# Patient Record
Sex: FEMALE | Race: WHITE | NOT HISPANIC OR LATINO | Employment: FULL TIME | URBAN - METROPOLITAN AREA
[De-identification: names, ages, dates, MRNs, and addresses within clinical notes are randomized per-mention and may not be internally consistent; named-entity substitution may affect disease eponyms.]

---

## 2017-07-28 ENCOUNTER — HOSPITAL ENCOUNTER (EMERGENCY)
Facility: CLINIC | Age: 32
Discharge: HOME | End: 2017-07-28
Attending: PHYSICIAN ASSISTANT | Admitting: PHYSICIAN ASSISTANT
Payer: OTHER MISCELLANEOUS

## 2017-07-28 VITALS
RESPIRATION RATE: 18 BRPM | BODY MASS INDEX: 45 KG/M2 | DIASTOLIC BLOOD PRESSURE: 91 MMHG | OXYGEN SATURATION: 98 % | TEMPERATURE: 98.1 F | WEIGHT: 280 LBS | HEART RATE: 87 BPM | HEIGHT: 66 IN | SYSTOLIC BLOOD PRESSURE: 133 MMHG

## 2017-07-28 DIAGNOSIS — S01.21XA NASAL LACERATION, INITIAL ENCOUNTER: ICD-10-CM

## 2017-07-28 DIAGNOSIS — S01.511A LIP LACERATION, INITIAL ENCOUNTER: ICD-10-CM
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
## 2017-07-28 PROCEDURE — 99283 EMERGENCY DEPT VISIT LOW MDM: CPT | Mod: 25

## 2017-07-28 PROCEDURE — 90715 TDAP VACCINE 7 YRS/> IM: CPT | Performed by: PHYSICIAN ASSISTANT

## 2017-07-28 PROCEDURE — 90471 IMMUNIZATION ADMIN: CPT

## 2017-07-28 PROCEDURE — 25000132 ZZH RX MED GY IP 250 OP 250 PS 637: Performed by: PHYSICIAN ASSISTANT

## 2017-07-28 PROCEDURE — 25000128 H RX IP 250 OP 636: Performed by: PHYSICIAN ASSISTANT

## 2017-07-28 PROCEDURE — 12015 RPR F/E/E/N/L/M 7.6-12.5 CM: CPT

## 2017-07-28 RX ORDER — HYDROCODONE BITARTRATE AND ACETAMINOPHEN 5; 325 MG/1; MG/1
1-2 TABLET ORAL EVERY 4 HOURS PRN
Qty: 10 TABLET | Refills: 0 | Status: SHIPPED | OUTPATIENT
Start: 2017-07-28 | End: 2017-12-27

## 2017-07-28 RX ORDER — HYDROCODONE BITARTRATE AND ACETAMINOPHEN 5; 325 MG/1; MG/1
2 TABLET ORAL ONCE
Status: COMPLETED | OUTPATIENT
Start: 2017-07-28 | End: 2017-07-28

## 2017-07-28 RX ORDER — DOXYCYCLINE 100 MG/1
100 CAPSULE ORAL 2 TIMES DAILY
Qty: 14 CAPSULE | Refills: 0 | Status: SHIPPED | OUTPATIENT
Start: 2017-07-28 | End: 2017-08-04

## 2017-07-28 RX ADMIN — CLOSTRIDIUM TETANI TOXOID ANTIGEN (FORMALDEHYDE INACTIVATED), CORYNEBACTERIUM DIPHTHERIAE TOXOID ANTIGEN (FORMALDEHYDE INACTIVATED), BORDETELLA PERTUSSIS TOXOID ANTIGEN (GLUTARALDEHYDE INACTIVATED), BORDETELLA PERTUSSIS FILAMENTOUS HEMAGGLUTININ ANTIGEN (FORMALDEHYDE INACTIVATED), BORDETELLA PERTUSSIS PERTACTIN ANTIGEN, AND BORDETELLA PERTUSSIS FIMBRIAE 2/3 ANTIGEN 0.5 ML: 5; 2; 2.5; 5; 3; 5 INJECTION, SUSPENSION INTRAMUSCULAR at 15:39

## 2017-07-28 RX ADMIN — HYDROCODONE BITARTRATE AND ACETAMINOPHEN 2 TABLET: 5; 325 TABLET ORAL at 15:40

## 2017-07-28 ASSESSMENT — ENCOUNTER SYMPTOMS: WOUND: 1

## 2017-07-28 NOTE — OP NOTE
Problem right PLASTIC SURGERY OPERATIVE NOTE  Patient Name:  Rima Freeman     Date of Service:  * No surgery found *     PREOPERATIVE DIAGNOSES:   1.  Traumatic laceration upper lip and nose    POSTOPERATIVE DIAGNOSES:   1.  Traumatic laceration upper lip and nose    SURGEON:  Sigrid Hinson MD   OR STAFF:  * Surgery not found *     ANESTHESIA:  * No surgery found *     ESTIMATED BLOOD LOSS:  * No surgery found *   SPECIMEN(S):  * Cannot find log *     PROCEDURES:   1.  Complex closure of laceration nose 5 cm  2.  Complex closure of laceration upper lip, 6 cm      DESCRIPTION OF PROCEDURE:  I was consulted for closure of a laceration to the nose and upper lip due to a dog bite.    Both areas were prepped and draped in a sterile manner. 1% lidocaine with epinephrine was used for local anesthesia.    The upper lip was repaired first. Several small lacerations were primarily closed with interrupted 6-0 nylon suture. The vermillion border was aligned as able although their was a defect in the vermillion. The border was repaired with interrupted 6-0 nylon suture. The intraoral mucosa was then advanced anteriorly to allow for closure of the vermillion. This was secured with interrupted 6-0 vicryl suture. The combined closure length was 6 cm.    The nasal lacerations were then repaired. Devitalized tissue was debrided. The lacerations were realigned and secured with interrupted 6-0 nylon suture. The laceration on the right side of the nose was through and through. The intranasal mucosa was realigned and secured with 6-0 vicryl suture. The external laceration was realigned and sutured with 6-0 nylon sutures. The combined closure length was 5 cm.    Antibiotic ointment applied.  Patient instructed to keep area dry for 24 hours and then may shower. She should apply antibiotic ointment to the suture lines daily. She will follow up in 10 days for suture removal. She will be started on antibiotics by ED  staff.    Sigrid Hinson MD  Plastic Surgery  [facility]  [phone] (office)

## 2017-07-28 NOTE — DISCHARGE INSTRUCTIONS

## 2017-07-28 NOTE — ED AVS SNAPSHOT
Emergency Department    [address] [facility] [address]    Phone:  [phone]    Fax:  [phone]                                       Rima Freeman   MRN: [id number]    Department:   Emergency Department   Date of Visit:  7/28/2017           After Visit Summary Signature Page     I have received my discharge instructions, and my questions have been answered. I have discussed any challenges I see with this plan with the nurse or doctor.    ..........................................................................................................................................  Patient/Patient Representative Signature      ..........................................................................................................................................  Patient Representative Print Name and Relationship to Patient    ..................................................               ................................................  Date                                            Time    ..........................................................................................................................................  Reviewed by Signature/Title    ...................................................              ..............................................  Date                                                            Time

## 2017-07-28 NOTE — ED AVS SNAPSHOT
Emergency Department    [address] [facility] [address]    Phone:  [phone]    Fax:  [phone]                                       Rima Freeman   MRN: [id number]    Department:   Emergency Department   Date of Visit:  7/28/2017           Patient Information     Date Of Birth          1985        Your diagnoses for this visit were:     Nasal laceration, initial encounter     Lip laceration, initial encounter     Dog bite, initial encounter        You were seen by Aspen Tidwell PA-C.      Follow-up Information     Follow up with Sigrid Hinson MD In 1 week.    Specialty:  Plastic Surgery    Contact information:    [facility]  [address] [address] [phone]          Follow up with  Emergency Department.    Specialty:  EMERGENCY MEDICINE    Why:  If symptoms worsen    Contact information:    [address] [facility] [address] [phone]        Discharge Instructions       Discharge Instructions  Laceration (Cut)    You were seen today for a laceration (cut).  Your doctor examined your laceration for any problems such a buried foreign body (like glass, a splinter, or gravel), or injury to blood vessels, tendons, and nerves.  Your doctor may have also rinsed and/or scrubbed your laceration to help prevent an infection.  Your laceration may have been closed with glue, staples or sutures (stitches).      It may not be possible to find all problems with your laceration on the first visit, and we can't always prevent infections.  Antibiotics are only given when the benefit is more than the risk, and don't prevent all infections. Some lacerations are too high risk to close, and are left open to heal.  All lacerations, no matter how expertly repaired, will cause scarring.    Return to the Emergency Department right away if:    You have more redness, swelling, pain, drainage (pus), a bad smell, or red streaking from your  laceration.      You have a fever of 101oF or more.    You have bleeding that you can t stop at home. If your cut starts to bleed, hold pressure on the bleeding area with a clean cloth or put pressure over the bandage.  If the bleeding doesn t stop after using constant pressure for 30 minutes, you should return to the Emergency Department for further treatment.    An area past the laceration is cool, pale, or blue compared with the other side, or has a slower return of color when squeezed.    Your dressing seems too tight or starts to get uncomfortable or painful.    You have loss of normal function or use of an area, such as being unable to straighten or bend a finger normally.    You have a numb area past the laceration.    Return to the Emergency Department or see your regular doctor if:    The laceration starts to come open.     You have something coming out of the cut or a feeling that there is something in the laceration.    Your wound will not heal, or keeps breaking open. There can always be glass, wood, dirt or other things in any wound.  They won t always show up, even on x-rays.  If a wound doesn t heal, this may be why, and it is important to follow-up with your regular doctor.    Home Care:    Take your dressing off in 12 hours, or as instructed by your doctor, to check your laceration. Remove the dressing sooner if it seems too tight or painful, or if it is getting numb, tingly, or pale past the dressing.    Gently wash your laceration 2 times a day with clean cloth and soap.     It is okay to shower, but do not let the laceration soak in water.      If your laceration was closed with wound adhesive or strips: pat it dry and leave it open to the air.     For all other repairs: after you wash your laceration, or at least 2 times a day, apply bacitracin or other antibiotic ointment to the laceration, then cover it with a Band-Aid  or gauze.    Keep the laceration clean. Wear gloves or other protective  "clothing if you are around dirt.    Follow-up:    Dr. Hinson in 1 week    Scars:  To help minimize scarring:    Wear sunscreen over the healed laceration when out in the sun.    Massage the area regularly.    You may use Vitamin E oil.    Wait a year.  Most scars will start to fade within a year.    Probiotics: If you have been given an antibiotic, you may want to also take a probiotic pill or eat yogurt with live cultures. Probiotics have \"good bacteria\" to help your intestines stay healthy. Studies have shown that probiotics help prevent diarrhea and other intestine problems (including C. diff infection) when you take antibiotics. You can buy these without a prescription in the pharmacy section of the store.     If you were given a prescription for medicine here today, be sure to read all of the information (including the package insert) that comes with your prescription.  This will include important information about the medicine, its side effects, and any warnings that you need to know about.  The pharmacist who fills the prescription can provide more information and answer questions you may have about the medicine.  If you have questions or concerns that the pharmacist cannot address, please call or return to the Emergency Department.     Opioid Medication Information    Pain medications are among the most commonly prescribed medicines, so we are including this information for all our patients. If you did not receive pain medication or get a prescription for pain medicine, you can ignore it.     You may have been given a prescription for an opioid (narcotic) pain medicine and/or have received a pain medicine while here in the Emergency Department. These medicines can make you drowsy or impaired. You must not drive, operate dangerous equipment, or engage in any other dangerous activities while taking these medications. If you drive while taking these medications, you could be arrested for DUI, or driving under " the influence. Do not drink any alcohol while you are taking these medications.     Opioid pain medications can cause addiction. If you have a history of chemical dependency of any type, you are at a higher risk of becoming addicted to pain medications.  Only take these prescribed medications to treat your pain when all other options have been tried. Take it for as short a time and as few doses as possible. Store your pain pills in a secure place, as they are frequently stolen and provide a dangerous opportunity for children or visitors in your house to start abusing these powerful medications. We will not replace any lost or stolen medicine.  As soon as your pain is better, you should flush all your remaining medication.     Many prescription pain medications contain Tylenol  (acetaminophen), including Vicodin , Tylenol #3 , Norco , Lortab , and Percocet .  You should not take any extra pills of Tylenol  if you are using these prescription medications or you can get very sick.  Do not ever take more than 3000 mg of acetaminophen in any 24 hour period.    All opioids tend to cause constipation. Drink plenty of water and eat foods that have a lot of fiber, such as fruits, vegetables, prune juice, apple juice and high fiber cereal.  Take a laxative if you don t move your bowels at least every other day. Miralax , Milk of Magnesia, Colace , or Senna  can be used to keep you regular.      Remember that you can always come back to the Emergency Department if you are not able to see your regular doctor in the amount of time listed above, if you get any new symptoms, or if there is anything that worries you.          24 Hour Appointment Hotline       To make an appointment at any [facility], call [phone] ([phone]). If you don't have a family doctor or clinic, we will help you find one. [place] clinics are conveniently located to serve the needs of you and your family.             Review of your  medicines      START taking        Dose / Directions Last dose taken    doxycycline 100 MG capsule   Commonly known as:  VIBRAMYCIN   Dose:  100 mg   Quantity:  14 capsule        Take 1 capsule (100 mg) by mouth 2 times daily for 7 days   Refills:  0        HYDROcodone-acetaminophen 5-325 MG per tablet   Commonly known as:  NORCO   Dose:  1-2 tablet   Quantity:  10 tablet        Take 1-2 tablets by mouth every 4 hours as needed for moderate to severe pain   Refills:  0                Prescriptions were sent or printed at these locations (2 Prescriptions)                   Other Prescriptions                Printed at Department/Unit printer (2 of 2)         doxycycline (VIBRAMYCIN) 100 MG capsule               HYDROcodone-acetaminophen (NORCO) 5-325 MG per tablet                Orders Needing Specimen Collection     None      Pending Results     No orders found from 7/26/2017 to 7/29/2017.            Pending Culture Results     No orders found from 7/26/2017 to 7/29/2017.            Pending Results Instructions     If you had any lab results that were not finalized at the time of your Discharge, you can call the ED Lab Result RN at [phone]. You will be contacted by this team for any positive Lab results or changes in treatment. The nurses are available 7 days a week from 10A to 6:30P.  You can leave a message 24 hours per day and they will return your call.        Test Results From Your Hospital Stay               Clinical Quality Measure: Blood Pressure Screening     Your blood pressure was checked while you were in the emergency department today. The last reading we obtained was  BP: 136/87 . Please read the guidelines below about what these numbers mean and what you should do about them.  If your systolic blood pressure (the top number) is less than 120 and your diastolic blood pressure (the bottom number) is less than 80, then your blood pressure is normal. There is nothing more that you need to do about  "it.  If your systolic blood pressure (the top number) is 120-139 or your diastolic blood pressure (the bottom number) is 80-89, your blood pressure may be higher than it should be. You should have your blood pressure rechecked within a year by a primary care provider.  If your systolic blood pressure (the top number) is 140 or greater or your diastolic blood pressure (the bottom number) is 90 or greater, you may have high blood pressure. High blood pressure is treatable, but if left untreated over time it can put you at risk for heart attack, stroke, or kidney failure. You should have your blood pressure rechecked by a primary care provider within the next 4 weeks.  If your provider in the emergency department today gave you specific instructions to follow-up with your doctor or provider even sooner than that, you should follow that instruction and not wait for up to 4 weeks for your follow-up visit.        Thank you for choosing [facility]       Thank you for choosing [facility] for your care. Our goal is always to provide you with excellent care. Hearing back from our patients is one way we can continue to improve our services. Please take a few minutes to complete the written survey that you may receive in the mail after you visit with us. Thank you!        [employer] Information     [employer] lets you send messages to your doctor, view your test results, renew your prescriptions, schedule appointments and more. To sign up, go to [email or web address] . Click on \"Log in\" on the left side of the screen, which will take you to the Welcome page. Then click on \"Sign up Now\" on the right side of the page.     You will be asked to enter the access code listed below, as well as some personal information. Please follow the directions to create your username and password.     Your access code is: [email or web address]  Expires: 10/26/2017  5:44 PM     Your access code will  in 90 days. If you need help or a new code, please call " your [facility] clinic or [phone].        Care EveryWhere ID     This is your Care EveryWhere ID. This could be used by other organizations to access your [facility] medical records  [id number]        Equal Access to Services     JAMIE SALTER : Amada Iglesias, waaxda luqadaha, qaybta kaalmada artie, reinaldo washburn. [facility] [phone].    ATENCIÓN: Si habla español, tiene a aguirre disposición servicios gratuitos de asistencia lingüística. Llame al [phone].    We comply with applicable federal civil rights laws and [place] laws. We do not discriminate on the basis of race, color, national origin, age, disability sex, sexual orientation or gender identity.            After Visit Summary       This is your record. Keep this with you and show to your community pharmacist(s) and doctor(s) at your next visit.

## 2017-07-28 NOTE — ED PROVIDER NOTES
"  History     Chief Complaint:  Dog Bite      HPI   Rima Freeman is a 31 year old female who presents with a Dog bite. 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 Boxer bit her on the face at 1430 (45 minutes ago). The patient's wounds and lacerations are on her nose and mouth area and she rates her current pain to be 9/10. The patient reports no other injuries from the incident today. The patient does not know when her last tetanus shot was. She states that there is no concern for the dog having rabies as it is up to date on its immunizations. She denies any difficulty breathing through her nose or dental involvement.     Allergies:  Amoxicillin  Augmentin  Clindamycin  Erythromycin  Penicillins    Medications:    The patient is currently on no regular medications.      Past Medical History:    History reviewed. No significant past medical history.     Past Surgical History:    History reviewed. No significant past surgical history.     Family History:    History reviewed. No significant family history.     Social History:  Tobacco use: Former Smoker, Former Smokeless Tobacco User  Alcohol use: Pos  The patient presents with a co-worker.     Review of Systems   HENT: Negative for congestion and dental problem.    Musculoskeletal:        + pain to wound sites   Skin: Positive for wound (Face and nose.).   All other systems reviewed and are negative.    Physical Exam   First Vitals:  Patient Vitals for the past 24 hrs:   BP Temp Temp src Heart Rate SpO2 Height Weight   07/28/17 1502 136/87 98.1  F (36.7  C) Oral 69 97 % 1.676 m (5' 6\") 127 kg (280 lb)               Physical Exam  General: Resting on the gurney, holding ice pack to face, appears uncomfortable.    Head:  As above.    No facial bone tenderness, step-offs or crepitus.   ENT:  Pupils are equal, round and reactive to light. EOM intact.     No septal hematoma or intranasal " laceration.   Neck:  Supple, no rigidity noted. Normal ROM.   Resp:  Non-labored breathing. No tachypnea.   MS:  Normal muscular tone.   Neuro:  GCS 15.     Awake and alert.   Skin:  As pictured above  Psych:  Normal affect. Appropriate interactions.      Emergency Department Course   Interventions:  1539: Adacel (Tdap), 0.5 mLs, IV  1540: Norco, 2 tablets, IV    Emergency Department Course:  Nursing notes and vitals reviewed.  I performed an exam of the patient as documented above.  1528: I spoke to Dr. Hinson of Plastic Surgery on the phone, who agreed to come and see the patient.  1635: Dr. Hinson of Plastic Surgery arrived to the ED to care for the patient and stitch up her facial wounds.  1737: I rechecked the patient and discussed discharge information.  Findings and plan explained to the Patient. Patient discharged home, status improved, with instructions regarding supportive care, medications, and reasons to return as well as the importance of close follow-up was reviewed.    Impression & Plan    Medical Decision Making:   Rima Freeman is a 31 year old female who presents for evaluation of a laceration to the nose and lip after a dog bite. I doubt a midface fracture and will not CT scan at this point. No signs of entrapment or displaced fracture on detailed midface clinical exam.  The wound was carefully evaluated and explored.  The laceration was closed by. Dr. Hinson of Plastic Surgery with sutures; see her note for details. No signs of foreign body.  Possible complications (infection, scarring) were reviewed with the patient.  Doxycycline was started for antibiotic prophylaxis as this is a dog bite. Tetanus status addressed.     Follow up with Plastic Surgery will be indicated for suture removal as noted in the discharge section. Dissolvable sutures were also placed and will need follow-up only if signs of infection or other issues arise.  I discussed the results, plan and any additional questions with the  patient. She verbalized understanding and agreement with the plan.      Diagnosis:    ICD-10-CM   1. Nasal laceration, initial encounter S01.21XA   2. Lip laceration, initial encounter S01.511A   3. Dog bite, initial encounter W54.0XXA     Disposition:  Discharged to home with Doxycycline and Norco.    Discharge Medications:  New Prescriptions    DOXYCYCLINE (VIBRAMYCIN) 100 MG CAPSULE    Take 1 capsule (100 mg) by mouth 2 times daily for 7 days    HYDROCODONE-ACETAMINOPHEN (NORCO) 5-325 MG PER TABLET    Take 1-2 tablets by mouth every 4 hours as needed for moderate to severe pain     Elvira NICHOLAS, am serving as a scribe on 7/28/2017 at 3:12 PM to personally document services performed by Aspen Tidwell PA-C, based on my observations and the provider's statements to me.     EMERGENCY DEPARTMENT       Aspen Tidwell PA-C  07/28/17 7357

## 2017-12-27 ENCOUNTER — APPOINTMENT (OUTPATIENT)
Dept: MRI IMAGING | Facility: CLINIC | Age: 32
End: 2017-12-27
Attending: EMERGENCY MEDICINE

## 2017-12-27 ENCOUNTER — HOSPITAL ENCOUNTER (EMERGENCY)
Facility: CLINIC | Age: 32
Discharge: HOME | End: 2017-12-28
Attending: EMERGENCY MEDICINE | Admitting: EMERGENCY MEDICINE

## 2017-12-27 DIAGNOSIS — G45.9 TRANSIENT CEREBRAL ISCHEMIA, UNSPECIFIED TYPE: ICD-10-CM

## 2017-12-27 LAB
ANION GAP SERPL CALCULATED.3IONS-SCNC: 5 MMOL/L (ref 3–14)
BUN SERPL-MCNC: 8 MG/DL (ref 7–30)
CALCIUM SERPL-MCNC: 8.8 MG/DL (ref 8.5–10.1)
CHLORIDE SERPL-SCNC: 104 MMOL/L (ref 94–109)
CO2 SERPL-SCNC: 28 MMOL/L (ref 20–32)
CREAT SERPL-MCNC: 0.57 MG/DL (ref 0.52–1.04)
ERYTHROCYTE [DISTWIDTH] IN BLOOD BY AUTOMATED COUNT: 12.6 % (ref 10–15)
GFR SERPL CREATININE-BSD FRML MDRD: >90 ML/MIN/1.7M2
GLUCOSE SERPL-MCNC: 116 MG/DL (ref 70–99)
HCG SERPL QL: NEGATIVE
HCT VFR BLD AUTO: 39.8 % (ref 35–47)
HGB BLD-MCNC: 13.1 G/DL (ref 11.7–15.7)
MCH RBC QN AUTO: 29.2 PG (ref 26.5–33)
MCHC RBC AUTO-ENTMCNC: 32.9 G/DL (ref 31.5–36.5)
MCV RBC AUTO: 89 FL (ref 78–100)
PLATELET # BLD AUTO: 475 10E9/L (ref 150–450)
POTASSIUM SERPL-SCNC: 3.9 MMOL/L (ref 3.4–5.3)
RBC # BLD AUTO: 4.48 10E12/L (ref 3.8–5.2)
SODIUM SERPL-SCNC: 137 MMOL/L (ref 133–144)
TROPONIN I SERPL-MCNC: <0.015 UG/L (ref 0–0.04)
WBC # BLD AUTO: 17.7 10E9/L (ref 4–11)

## 2017-12-27 PROCEDURE — 84703 CHORIONIC GONADOTROPIN ASSAY: CPT | Performed by: EMERGENCY MEDICINE

## 2017-12-27 PROCEDURE — 25000125 ZZHC RX 250: Performed by: EMERGENCY MEDICINE

## 2017-12-27 PROCEDURE — 93005 ELECTROCARDIOGRAM TRACING: CPT

## 2017-12-27 PROCEDURE — 70553 MRI BRAIN STEM W/O & W/DYE: CPT

## 2017-12-27 PROCEDURE — 25000132 ZZH RX MED GY IP 250 OP 250 PS 637: Performed by: EMERGENCY MEDICINE

## 2017-12-27 PROCEDURE — 99285 EMERGENCY DEPT VISIT HI MDM: CPT | Mod: 25

## 2017-12-27 PROCEDURE — 85027 COMPLETE CBC AUTOMATED: CPT | Performed by: EMERGENCY MEDICINE

## 2017-12-27 PROCEDURE — 84484 ASSAY OF TROPONIN QUANT: CPT | Performed by: EMERGENCY MEDICINE

## 2017-12-27 PROCEDURE — 25000128 H RX IP 250 OP 636: Performed by: RADIOLOGY

## 2017-12-27 PROCEDURE — A9585 GADOBUTROL INJECTION: HCPCS | Performed by: RADIOLOGY

## 2017-12-27 PROCEDURE — 80048 BASIC METABOLIC PNL TOTAL CA: CPT | Performed by: EMERGENCY MEDICINE

## 2017-12-27 RX ORDER — ACETAMINOPHEN 500 MG
500 TABLET ORAL EVERY 4 HOURS PRN
Status: DISCONTINUED | OUTPATIENT
Start: 2017-12-27 | End: 2017-12-28 | Stop reason: HOSPADM

## 2017-12-27 RX ORDER — GADOBUTROL 604.72 MG/ML
15 INJECTION INTRAVENOUS ONCE
Status: COMPLETED | OUTPATIENT
Start: 2017-12-27 | End: 2017-12-27

## 2017-12-27 RX ORDER — ONDANSETRON 4 MG/1
4 TABLET, ORALLY DISINTEGRATING ORAL ONCE
Status: COMPLETED | OUTPATIENT
Start: 2017-12-27 | End: 2017-12-27

## 2017-12-27 RX ADMIN — GADOBUTROL 13 ML: 604.72 INJECTION INTRAVENOUS at 23:49

## 2017-12-27 RX ADMIN — ACETAMINOPHEN 500 MG: 500 TABLET, FILM COATED ORAL at 18:43

## 2017-12-27 RX ADMIN — ONDANSETRON 4 MG: 4 TABLET, ORALLY DISINTEGRATING ORAL at 18:43

## 2017-12-27 ASSESSMENT — ENCOUNTER SYMPTOMS
LIGHT-HEADEDNESS: 1
HEADACHES: 1
VOMITING: 1
NUMBNESS: 1
NAUSEA: 1
SPEECH DIFFICULTY: 1
SHORTNESS OF BREATH: 0

## 2017-12-27 NOTE — ED AVS SNAPSHOT
[facility] Emergency Department    [address]    [facility] [address]    Phone:  [phone]    Fax:  [phone]                                       Rima Freeman   MRN: [id number]    Department:  [facility] Emergency Department   Date of Visit:  12/27/2017           After Visit Summary Signature Page     I have received my discharge instructions, and my questions have been answered. I have discussed any challenges I see with this plan with the nurse or doctor.    ..........................................................................................................................................  Patient/Patient Representative Signature      ..........................................................................................................................................  Patient Representative Print Name and Relationship to Patient    ..................................................               ................................................  Date                                            Time    ..........................................................................................................................................  Reviewed by Signature/Title    ...................................................              ..............................................  Date                                                            Time

## 2017-12-27 NOTE — ED AVS SNAPSHOT
[facility]    [address]    [facility] [address]    Phone:  [phone]    Fax:  [phone]                                       Rima Freeman   MRN: [id number]    Department:  [facility]   Date of Visit:  12/27/2017           Patient Information     Date Of Birth          1985        Your diagnoses for this visit were:     Transient cerebral ischemia, unspecified type        You were seen by Adria Salas MD.      Follow-up Information     Follow up with [facility]. Call in 1 day.    Contact information:    [address] [facility] [id number] [phone]        Discharge Instructions         Discharge Instructions for Transient Ischemic Attack (TIA)  You have been diagnosed with a transient ischemic attack (TIA). You can think of a TIA as a temporary or mini-stroke. Blood temporarily could not reach part of your brain. Unlike a stroke, TIAs usually cause no lasting damage. If you think you are having symptoms of a TIA or stroke, get medical help right away--even if the symptoms go away.   Prevention    Take your medicines exactly as directed. Don t skip doses.    Learn to take your blood pressure. Keep a log for your doctor.    Change your diet if your doctor tells you to. Your doctor may suggest that you cut back on salt. If so, here are some tips:    Limit canned, dried, packaged, and fast foods.    Don t add salt to your food at the table.    Season foods with herbs instead of salt when you cook.    Maintain a healthy weight. Get help to lose any extra pounds.    Begin an exercise program. Ask your doctor how to get started. You can benefit from simple activities, such as walking or gardening.    Limit your alcohol intake to no more than 2 drinks a day.    Know your cholesterol level. Follow your doctor s advice about how to keep cholesterol under control.    If you are a smoker, you  need to quit now. Enroll in a stop-smoking program to improve your chances of success. Ask your doctor about medicines or other methods to help you quit.    Your healthcare provider will give you information on dietary changes that you may need to make, based on your situation. Your provider may recommend that you see a registered dietitian for help with diet changes. Changes may include:    Reducing fat and cholesterol intake    Reducing sodium (salt) intake, especially if you have high blood pressure    Increasing your intake of fresh vegetables and fruits    Eating lean proteins, such as fish, poultry, and legumes (beans and peas) and eating less red meat and processed meats    Using low-fat dairy products    Using vegetable and nut oils in limited amounts    Limiting sweets and processed foods such as chips, cookies, and baked goods    If you are overweight, your healthcare provider will work with you to lose weight and lower your body mass index (BMI) to a normal or near-normal level. Making diet changes and increasing physical activity can help.    Begin an exercise program. Ask your doctor how to get started and how much activity you should try to get on a daily or weekly basis. You can benefit from simple activities such as walking or gardening.    Learn stress-management techniques to help you deal with stress in your home and work life.  Follow-up care    Some medicines require blood tests to check for progress or problems. Keep follow-up appointments for any blood tests ordered by your doctors.     When to call 911  Call 911 right away if you have any of the following:    Weakness, tingling, or loss of feeling on one side of your face or body    Sudden double vision, or trouble seeing in one or both eyes    Sudden trouble talking, or slurring your speech    Trouble understanding others    Sudden, severe headache    Dizziness, loss of balance, or a spinning feeling, a sense of falling    Blackouts or  seizures   Date Last Reviewed: 5/1/2017 2000-2017 [employer]. [address] [place] [address], [place] [address]. All rights reserved. This information is not intended as a substitute for professional medical care. Always follow your healthcare professional's instructions.          24 Hour Appointment Hotline       To make an appointment at any [facility], call [phone] ([phone]). If you don't have a family doctor or clinic, we will help you find one. [facility] are conveniently located to serve the needs of you and your family.             Review of your medicines      START taking        Dose / Directions Last dose taken    aspirin 81 MG chewable tablet   Dose:  325 mg   Quantity:  108 tablet        Take 4 tablets (325 mg) by mouth daily   Refills:  0        ondansetron 4 MG ODT tab   Commonly known as:  ZOFRAN ODT   Dose:  4 mg   Quantity:  10 tablet        Take 1 tablet (4 mg) by mouth every 8 hours as needed for nausea   Refills:  0          Our records show that you are taking the medicines listed below. If these are incorrect, please call your family doctor or clinic.        Dose / Directions Last dose taken    DOXYCYCLINE HYCLATE PO        Refills:  0                Prescriptions were sent or printed at these locations (2 Prescriptions)                   Other Prescriptions                Printed at Department/Unit printer (2 of 2)         ondansetron (ZOFRAN ODT) 4 MG ODT tab               aspirin 81 MG chewable tablet                Procedures and tests performed during your visit     Basic metabolic panel (BMP)    CBC (platelets, no diff)    EKG 12 lead    HCG QUALitative pregnancy (blood)    MR Brain w/o & w Contrast    Troponin I      Orders Needing Specimen Collection     None      Pending Results     Date and Time Order Name Status Description    12/27/2017 2236 MR Brain w/o & w Contrast In process     12/27/2017 2236 EKG 12 lead Preliminary             Pending Culture  Results     No orders found for last 3 day(s).            Pending Results Instructions     If you had any lab results that were not finalized at the time of your Discharge, you can call the ED Lab Result RN at [phone]. You will be contacted by this team for any positive Lab results or changes in treatment. The nurses are available 7 days a week from 10A to 6:30P.  You can leave a message 24 hours per day and they will return your call.        Test Results From Your Hospital Stay        12/27/2017 10:09 PM      Component Results     Component Value Ref Range & Units Status    WBC 17.7 (H) 4.0 - 11.0 10e9/L Final    RBC Count 4.48 3.8 - 5.2 10e12/L Final    Hemoglobin 13.1 11.7 - 15.7 g/dL Final    Hematocrit 39.8 35.0 - 47.0 % Final    MCV 89 78 - 100 fl Final    MCH 29.2 26.5 - 33.0 pg Final    MCHC 32.9 31.5 - 36.5 g/dL Final    RDW 12.6 10.0 - 15.0 % Final    Platelet Count 475 (H) 150 - 450 10e9/L Final         12/27/2017 10:24 PM      Component Results     Component Value Ref Range & Units Status    Sodium 137 133 - 144 mmol/L Final    Potassium 3.9 3.4 - 5.3 mmol/L Final    Chloride 104 94 - 109 mmol/L Final    Carbon Dioxide 28 20 - 32 mmol/L Final    Anion Gap 5 3 - 14 mmol/L Final    Glucose 116 (H) 70 - 99 mg/dL Final    Urea Nitrogen 8 7 - 30 mg/dL Final    Creatinine 0.57 0.52 - 1.04 mg/dL Final    GFR Estimate >90 >60 mL/min/1.7m2 Final    Non  GFR Calc    GFR Estimate If Black >90 >60 mL/min/1.7m2 Final    African American GFR Calc    Calcium 8.8 8.5 - 10.1 mg/dL Final         12/27/2017 10:25 PM      Component Results     Component Value Ref Range & Units Status    HCG Qualitative Serum Negative NEG^Negative Final    This test is for screening purposes.  Results should be interpreted along with   the clinical picture.  Confirmation testing is available if warranted by   ordering [id number], HCG Quantitative Pregnancy.           12/27/2017 11:29 PM      Result not yet available     Exam  Ended         12/27/2017 11:42 PM      Component Results     Component Value Ref Range & Units Status    Troponin I ES <0.015 0.000 - 0.045 ug/L Final    The 99th percentile for upper reference range is 0.045 ug/L.  Troponin values   in the range of 0.045 - 0.120 ug/L may be associated with risks of adverse   clinical events.                  Clinical Quality Measure: Blood Pressure Screening     Your blood pressure was checked while you were in the emergency department today. The last reading we obtained was  BP: 126/53 . Please read the guidelines below about what these numbers mean and what you should do about them.  If your systolic blood pressure (the top number) is less than 120 and your diastolic blood pressure (the bottom number) is less than 80, then your blood pressure is normal. There is nothing more that you need to do about it.  If your systolic blood pressure (the top number) is 120-139 or your diastolic blood pressure (the bottom number) is 80-89, your blood pressure may be higher than it should be. You should have your blood pressure rechecked within a year by a primary care provider.  If your systolic blood pressure (the top number) is 140 or greater or your diastolic blood pressure (the bottom number) is 90 or greater, you may have high blood pressure. High blood pressure is treatable, but if left untreated over time it can put you at risk for heart attack, stroke, or kidney failure. You should have your blood pressure rechecked by a primary care provider within the next 4 weeks.  If your provider in the emergency department today gave you specific instructions to follow-up with your doctor or provider even sooner than that, you should follow that instruction and not wait for up to 4 weeks for your follow-up visit.        Thank you for choosing [facility]       Thank you for choosing [facility] for your care. Our goal is always to provide you with excellent care. Hearing back from our patients is one  "way we can continue to improve our services. Please take a few minutes to complete the written survey that you may receive in the mail after you visit with us. Thank you!        [employer] Information     [employer] lets you send messages to your doctor, view your test results, renew your prescriptions, schedule appointments and more. To sign up, go to [email or web address] . Click on \"Log in\" on the left side of the screen, which will take you to the Welcome page. Then click on \"Sign up Now\" on the right side of the page.     You will be asked to enter the access code listed below, as well as some personal information. Please follow the directions to create your username and password.     Your access code is: [email or web address]  Expires: 3/28/2018 12:46 AM     Your access code will  in 90 days. If you need help or a new code, please call your [place] clinic or [phone].        Care EveryWhere ID     This is your Care EveryWhere ID. This could be used by other organizations to access your [facility] medical records  [id number]        Equal Access to Services     JAMIE SALTER : Hadyared Iglesias, rosalie carranza, eleazar alva, reinaldo washburn. So [place] -[phone].    ATENCIÓN: Si habla español, tiene a aguirre disposición servicios gratuitos de asistencia lingüística. Pancho al [phone].    We comply with applicable federal civil rights laws and [place] laws. We do not discriminate on the basis of race, color, national origin, age, disability, sex, sexual orientation, or gender identity.            After Visit Summary       This is your record. Keep this with you and show to your community pharmacist(s) and doctor(s) at your next visit.                  "

## 2017-12-27 NOTE — LETTER
December 28, 2017      To Whom It May Concern:      Rima Freeman was seen in our Emergency Department today, 12/28/17.  I expect her condition to improve over the next 1-2 days.  She may return to work when improved.    Sincerely,        Nazia Almeida RN

## 2017-12-28 VITALS
RESPIRATION RATE: 16 BRPM | SYSTOLIC BLOOD PRESSURE: 120 MMHG | TEMPERATURE: 97.5 F | DIASTOLIC BLOOD PRESSURE: 59 MMHG | WEIGHT: 283 LBS | BODY MASS INDEX: 45.68 KG/M2 | HEART RATE: 81 BPM | OXYGEN SATURATION: 97 %

## 2017-12-28 LAB — INTERPRETATION ECG - MUSE: NORMAL

## 2017-12-28 PROCEDURE — 25000132 ZZH RX MED GY IP 250 OP 250 PS 637: Performed by: EMERGENCY MEDICINE

## 2017-12-28 RX ORDER — ASPIRIN 81 MG/1
325 TABLET, CHEWABLE ORAL DAILY
Qty: 108 TABLET | Refills: 0 | Status: SHIPPED | OUTPATIENT
Start: 2017-12-28

## 2017-12-28 RX ORDER — ASPIRIN 325 MG
325 TABLET ORAL ONCE
Status: COMPLETED | OUTPATIENT
Start: 2017-12-28 | End: 2017-12-28

## 2017-12-28 RX ORDER — ONDANSETRON 4 MG/1
4 TABLET, ORALLY DISINTEGRATING ORAL EVERY 8 HOURS PRN
Qty: 10 TABLET | Refills: 0 | Status: SHIPPED | OUTPATIENT
Start: 2017-12-28 | End: 2017-12-31

## 2017-12-28 RX ADMIN — ASPIRIN 325 MG ORAL TABLET 325 MG: 325 PILL ORAL at 00:51

## 2017-12-28 NOTE — ED PROVIDER NOTES
"  History     Chief Complaint:  Visual disturbance and paresthesias     HPI   Rima Freeman is a 32-year-old female who presents for evaluation for an episode today where she had a black spot \"like when you look into a bright light\" in her right eye on the right side of her field of vision.  The episode occurred while the patient was at work at [employer] around 1500.  She states that the black spot was there for 45-60 minutes and went away with no interventions.  She states that she subsequently got a headache right after this went away.  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  The patient went for a break after this and states that she felt very lightheaded but did not lose consciousness.  She was subsequently sent home from work and while waiting for her friend to pick her up she had right arm numbness as well as numbness in the right side of her tongue.  The arm numbness around ceased around 1745 and she then reports that she had nausea and an episode of vomiting.  She states that her vomit was orange in color but she has not eaten anything orange.  On presentation, the patient complains of a left-sided headache.  She was given Zofran while in triage and no longer has any nausea.  She has no numbness, tingling, or visual disturbance on presentation.    Of note, the patient has a history of migraines and she states they are worse and more frequent when she is having her period, which she just started today.  She also notes that she has a history of many concussions while she was a child.  The patient is currently on a course of doxycycline for a sinus infection.    Allergies:  Amoxicillin  Augmentin  Clindamycin  Erythromycin  Penicillins      Medications:    Doxycycline    Nexplanon    Past Medical History:    History reviewed.  No significant past medical history.      Past Surgical " History:    Orthopedic surgery     Family History:    History reviewed. No pertinent family history.     Social History:  Marital Status: Single   Presents to the ED with friend and mother   Tobacco Use: Former smoker   Alcohol Use: Yes      Review of Systems   Eyes: Positive for visual disturbance.   Respiratory: Negative for shortness of breath.    Cardiovascular: Negative for chest pain.   Gastrointestinal: Positive for nausea and vomiting.   Neurological: Positive for speech difficulty, light-headedness, numbness ( right arm and right side of tongue ) and headaches. Negative for syncope.   All other systems reviewed and are negative.    Physical Exam   Patient Vitals for the past 24 hrs:   BP Temp Temp src Pulse Resp SpO2 Weight   12/27/17 2315 126/53 - - - - 97 % -   12/27/17 2300 121/54 - - - - 98 % -   12/27/17 2230 117/71 - - - - 99 % -   12/27/17 2215 119/62 - - - - 97 % -   12/27/17 2203 - - - 81 - - -   12/27/17 2200 120/76 - - - - 98 % -   12/27/17 1840 106/78 97.5  F (36.4  C) Temporal 109 16 98 % 128.4 kg (283 lb)     Physical Exam  Constitutional:  Oriented to person, place, and time. Well-appearing.   HENT:   Head:    Normocephalic.   Mouth/Throat:   Oropharynx is clear and moist.   Eyes:    EOM are normal. Pupils are equal, round, and reactive to light.   Neck:    Neck supple.   Cardiovascular:  Normal rate, regular rhythm and normal heart sounds.      Exam reveals no gallop and no friction rub.       No murmur heard.  Pulmonary/Chest:  Effort normal and breath sounds normal.      No respiratory distress. No wheezes. No rales.      No reproducible chest wall pain.  Abdominal:   Soft. No distension. No tenderness. No rebound and no guarding.   Musculoskeletal:  Normal range of motion.   Neurological:   Alert and oriented to person, place, and time.           Moves all 4 extremities spontaneously. NIH 0. Cranial nerves 2-12 grossly normal. No ataxia. 5/5 strength and sensation intact to light touch in  all 4 extremities.    Skin:    No rash noted. No pallor.      Emergency Department Course   ECG:  @ 2251  Indication: Possible arrhythmia   Vent. Rate 87 bpm. ND interval 144 ms. QRS duration 100 ms. QT/QTc 402/483 ms. P-R-T axis 13 -6 28.   Normal sinus rhythm. Normal ECG.   Read @ 2255 by Dr. Salas.     Imaging:  MR Brain w/o & w Contrast  No acute infarct, mass, mass-effect, or hemorrhage.  Report per [facility].    Radiographic findings were communicated with the patient who voiced understanding of the findings.    Laboratory:  Blood:  CBC:  WBC 17.7, HGB 13.1, , otherwise WNL   BMP:  WNL (Creatinine 0.57)   HCG Qualitative Pregnancy: Negative.   Troponin I: <0.015 (WNL)    Interventions:  (1843) Zofran ODT, 4 mg, PO  (1843) Tylenol, 500 mg, PO      Emergency Department Course:  The patient's lab work was initially started by Dr. Ronaldo Hollingsworth.     Nursing notes and vitals reviewed.    (2221) I entered the room with my scribe, obtained the history, and performed an exam of the patient as documented above.    EKG was done, interpretation as above.     A peripheral IV was established. Blood was drawn from the patient. This was sent for laboratory testing, findings above.      The patient was sent for a MRI of the brain while in the emergency department, findings above.      (0041) I consulted with Dr. Segovia of Neurology regarding the patient.     (0050) I updated the patient on the findings and the plan. Answered questions prior to discharge.     Findings and plan explained to the patient. Patient discharged home with instructions regarding supportive care, medications, and reasons to return. The importance of close follow-up was reviewed. The patient was prescribed aspirin and Zofran.      Impression & Plan    Medical Decision Making:  Rima Freeman is a 32-year-old female who presents for evaluation of visual disturbance and paresthesias as well as headache, as stated above..      This is consistent  with possible transient ischemic attack.     Detailed neurologic exam here in ED shows complete resolution of symptoms.  Imaging as noted above.  Based on symptom resolution and NIH stroke scale, they are not a candidate for TPA. Did discuss this with the patient and they express understanding. Differential considered for symptoms included CVA, TIA, dissection, cerebral tumor, migraine, infection, metabolic derangement, demyelination, etc.  EKG shows no atrial fibrillation nor arrhythmia noted on telemetry monitoring.  The patient has a low ABCD2 score and at this point I feel she is safe for discharge home with close follow-up with neurology in 1-2 days.  I spoke with Dr. Segovia, neurology on call, who is in agreement with this plan.    Diagnosis:    ICD-10-CM   1. Transient cerebral ischemia, unspecified type G45.9     Disposition:  discharged to home    Discharge Medications:  New Prescriptions    ASPIRIN 81 MG CHEWABLE TABLET    Take 4 tablets (325 mg) by mouth daily    ONDANSETRON (ZOFRAN ODT) 4 MG ODT TAB    Take 1 tablet (4 mg) by mouth every 8 hours as needed for nausea           [facility] EMERGENCY DEPARTMENT      Scribe disclosure:  I, Lee White, am serving as a scribe on 12/27/2017 at 10:21 PM to personally document services performed by Adria Salas MD, based on my observations and the provider's statements to me.                 Adria Salas MD  12/28/17 0132

## 2017-12-28 NOTE — DISCHARGE INSTRUCTIONS
Discharge Instructions for Transient Ischemic Attack (TIA)  You have been diagnosed with a transient ischemic attack (TIA). You can think of a TIA as a temporary or mini-stroke. Blood temporarily could not reach part of your brain. Unlike a stroke, TIAs usually cause no lasting damage. If you think you are having symptoms of a TIA or stroke, get medical help right away--even if the symptoms go away.   Prevention    Take your medicines exactly as directed. Don t skip doses.    Learn to take your blood pressure. Keep a log for your doctor.    Change your diet if your doctor tells you to. Your doctor may suggest that you cut back on salt. If so, here are some tips:    Limit canned, dried, packaged, and fast foods.    Don t add salt to your food at the table.    Season foods with herbs instead of salt when you cook.    Maintain a healthy weight. Get help to lose any extra pounds.    Begin an exercise program. Ask your doctor how to get started. You can benefit from simple activities, such as walking or gardening.    Limit your alcohol intake to no more than 2 drinks a day.    Know your cholesterol level. Follow your doctor s advice about how to keep cholesterol under control.    If you are a smoker, you need to quit now. Enroll in a stop-smoking program to improve your chances of success. Ask your doctor about medicines or other methods to help you quit.    Your healthcare provider will give you information on dietary changes that you may need to make, based on your situation. Your provider may recommend that you see a registered dietitian for help with diet changes. Changes may include:    Reducing fat and cholesterol intake    Reducing sodium (salt) intake, especially if you have high blood pressure    Increasing your intake of fresh vegetables and fruits    Eating lean proteins, such as fish, poultry, and legumes (beans and peas) and eating less red meat and processed meats    Using low-fat dairy products    Using  vegetable and nut oils in limited amounts    Limiting sweets and processed foods such as chips, cookies, and baked goods    If you are overweight, your healthcare provider will work with you to lose weight and lower your body mass index (BMI) to a normal or near-normal level. Making diet changes and increasing physical activity can help.    Begin an exercise program. Ask your doctor how to get started and how much activity you should try to get on a daily or weekly basis. You can benefit from simple activities such as walking or gardening.    Learn stress-management techniques to help you deal with stress in your home and work life.  Follow-up care    Some medicines require blood tests to check for progress or problems. Keep follow-up appointments for any blood tests ordered by your doctors.     When to call 911  Call 911 right away if you have any of the following:    Weakness, tingling, or loss of feeling on one side of your face or body    Sudden double vision, or trouble seeing in one or both eyes    Sudden trouble talking, or slurring your speech    Trouble understanding others    Sudden, severe headache    Dizziness, loss of balance, or a spinning feeling, a sense of falling    Blackouts or seizures   Date Last Reviewed: 5/1/2017 2000-2017 [employer]. [address] [place] [address], [place] [address]. All rights reserved. This information is not intended as a substitute for professional medical care. Always follow your healthcare professional's instructions.

## 2017-12-28 NOTE — ED NOTES
Pt arrives with friend she was at work and reports seeing a black spot in her right eye lasted maybe an hour was able to continue to work, then pt reports she had trouble speaking for a short time then she felt lightheaded and nauseous, she then reports she her right arm felt asleep and her tongue she now has a left sided mild HA, pt symptoms have resolved besides nausea and headache. Pt is current on doxycyline for sinus infection started Friday, a/ox 3, CMS intact. Clear speech no difficulties with ambulation. Provided 4 mg ODT zofran and tylenol for headache. ABC's intact.

## 2019-04-29 ENCOUNTER — OFFICE VISIT (OUTPATIENT)
Dept: FAMILY MEDICINE | Facility: CLINIC | Age: 34
End: 2019-04-29

## 2019-04-29 VITALS
BODY MASS INDEX: 40.68 KG/M2 | DIASTOLIC BLOOD PRESSURE: 82 MMHG | OXYGEN SATURATION: 95 % | HEART RATE: 97 BPM | WEIGHT: 259.2 LBS | SYSTOLIC BLOOD PRESSURE: 128 MMHG | HEIGHT: 67 IN

## 2019-04-29 DIAGNOSIS — F39 MOOD DISORDER (H): ICD-10-CM

## 2019-04-29 DIAGNOSIS — Z00.00 ROUTINE GENERAL MEDICAL EXAMINATION AT A HEALTH CARE FACILITY: Primary | ICD-10-CM

## 2019-04-29 DIAGNOSIS — N92.1 MENOMETRORRHAGIA: ICD-10-CM

## 2019-04-29 DIAGNOSIS — J32.9 CHRONIC SINUSITIS, UNSPECIFIED LOCATION: ICD-10-CM

## 2019-04-29 DIAGNOSIS — E66.01 MORBID OBESITY (H): ICD-10-CM

## 2019-04-29 DIAGNOSIS — Z13.21 ENCOUNTER FOR VITAMIN DEFICIENCY SCREENING: ICD-10-CM

## 2019-04-29 DIAGNOSIS — Z13.6 SCREENING FOR CARDIOVASCULAR CONDITION: ICD-10-CM

## 2019-04-29 LAB
% GRANULOCYTES: 63.5 % (ref 42.2–75.2)
HCT VFR BLD AUTO: 39.7 % (ref 35–46)
HEMOGLOBIN: 13.2 G/DL (ref 11.8–15.5)
LYMPHOCYTES NFR BLD AUTO: 29.4 % (ref 20.5–51.1)
MCH RBC QN AUTO: 29.1 PG (ref 27–31)
MCHC RBC AUTO-ENTMCNC: 33.2 G/DL (ref 33–37)
MCV RBC AUTO: 87.4 FL (ref 80–100)
MONOCYTES NFR BLD AUTO: 7.1 % (ref 1.7–9.3)
PLATELET # BLD AUTO: 437 K/UL (ref 140–450)
RBC # BLD AUTO: 4.54 X10/CMM (ref 3.7–5.2)
WBC # BLD AUTO: 6.3 X10/CMM (ref 3.8–11)

## 2019-04-29 PROCEDURE — 99385 PREV VISIT NEW AGE 18-39: CPT | Performed by: FAMILY MEDICINE

## 2019-04-29 PROCEDURE — 85025 COMPLETE CBC W/AUTO DIFF WBC: CPT | Performed by: FAMILY MEDICINE

## 2019-04-29 PROCEDURE — 36415 COLL VENOUS BLD VENIPUNCTURE: CPT | Performed by: FAMILY MEDICINE

## 2019-04-29 RX ORDER — FEXOFENADINE HCL 180 MG/1
180 TABLET ORAL DAILY
COMMUNITY

## 2019-04-29 SDOH — HEALTH STABILITY: MENTAL HEALTH: HOW OFTEN DO YOU HAVE A DRINK CONTAINING ALCOHOL?: 2-4 TIMES A MONTH

## 2019-04-29 SDOH — HEALTH STABILITY: MENTAL HEALTH: HOW MANY STANDARD DRINKS CONTAINING ALCOHOL DO YOU HAVE ON A TYPICAL DAY?: 3 OR 4

## 2019-04-29 ASSESSMENT — MIFFLIN-ST. JEOR: SCORE: 1910.41

## 2019-04-29 NOTE — PROGRESS NOTES
Bumps on legs   SUBJECTIVE:   CC: Rima Freeman is an 33 year old woman who presents for preventive health visit.     Healthy Habits:    Do you get at least three servings of calcium containing foods daily (dairy, green leafy vegetables, etc.)? yes    Amount of exercise or daily activities, outside of work: 2 day(s) per week with , strength training, some cardio    Problems taking medications regularly No    Medication side effects: No    Have you had an eye exam in the past two years? yes    Do you see a dentist twice per year? yes    Do you have sleep apnea, excessive snoring or daytime drowsiness?yes snoring      PROBLEMS TO ADD ON...  Concerned about her moods and stress. She made a bad judgement call at work recently and isn't sure if it was due to stress or impulse control. She does think there is a component of seasonal affective disorder.    Today's PHQ-2 Score: No flowsheet data found.    Abuse: Current or Past(Physical, Sexual or Emotional)- No  Do you feel safe in your environment? Yes    Social History     Tobacco Use     Smoking status: Former Smoker     Packs/day: 0.20     Years: 15.00     Pack years: 3.00     Types: Cigarettes     Last attempt to quit: 2017     Years since quittin.6     Smokeless tobacco: Former User     Quit date: 2006   Substance Use Topics     Alcohol use: Yes     Frequency: 2-4 times a month     Drinks per session: 3 or 4     If you drink alcohol do you typically have >3 drinks per day or >7 drinks per week? No                     Reviewed orders with patient.  Reviewed health maintenance and updated orders accordingly - Yes  Labs reviewed in EPIC  BP Readings from Last 3 Encounters:   19 128/82   17 120/59   17 (!) 133/91    Wt Readings from Last 3 Encounters:   19 117.6 kg (259 lb 3.2 oz)   17 128.4 kg (283 lb)   17 127 kg (280 lb)                  Patient Active Problem List   Diagnosis     Morbid obesity (H)      Past Surgical History:   Procedure Laterality Date     ORTHOPEDIC SURGERY         Social History     Tobacco Use     Smoking status: Former Smoker     Packs/day: 0.20     Years: 15.00     Pack years: 3.00     Types: Cigarettes     Last attempt to quit: 2017     Years since quittin.6     Smokeless tobacco: Former User     Quit date: 2006   Substance Use Topics     Alcohol use: Yes     Frequency: 2-4 times a month     Drinks per session: 3 or 4     Family History   Adopted: Yes         Current Outpatient Medications   Medication Sig Dispense Refill     fexofenadine (ALLEGRA) 180 MG tablet Take 180 mg by mouth daily       aspirin 81 MG chewable tablet Take 4 tablets (325 mg) by mouth daily (Patient not taking: Reported on 2019) 108 tablet 0     DOXYCYCLINE HYCLATE PO        Allergies   Allergen Reactions     Peanuts [Nuts] Hives     Iodine Solution [Povidone Iodine] Hives     Amoxicillin      Augmentin      Clindamycin      Erythromycin      Penicillins      Recent Labs   Lab Test 17  2155   CR 0.57   GFRESTIMATED >90   GFRESTBLACK >90   POTASSIUM 3.9        Mammogram not appropriate for this patient based on age.    Pertinent mammograms are reviewed under the imaging tab.  History of abnormal Pap smear: NO - age 30- 65 PAP every 3 years recommended     Reviewed and updated as needed this visit by clinical staff  Tobacco  Allergies  Soc Hx        Reviewed and updated as needed this visit by Provider  Tobacco  Soc Hx       Past Medical History:   Diagnosis Date     Chronic sinusitis       Past Surgical History:   Procedure Laterality Date     ORTHOPEDIC SURGERY       OB History    Para Term  AB Living   0 0 0 0 0 0   SAB TAB Ectopic Multiple Live Births   0 0 0 0 0       ROS:  CONSTITUTIONAL: NEGATIVE for fever, chills, change in weight  INTEGUMENTARU/SKIN: NEGATIVE for worrisome rashes, moles or lesions  EYES: NEGATIVE for vision changes or irritation  ENT: NEGATIVE for ear,  "mouth and throat problems  RESP: NEGATIVE for significant cough or SOB  BREAST: NEGATIVE for masses, tenderness or discharge  CV: NEGATIVE for chest pain, palpitations or peripheral edema  GI: NEGATIVE for nausea, abdominal pain, heartburn, or change in bowel habits  : NEGATIVE for unusual urinary or vaginal symptoms. Periods are regular.  MUSCULOSKELETAL: NEGATIVE for significant arthralgias or myalgia  NEURO: NEGATIVE for weakness, dizziness or paresthesias  PSYCHIATRIC: NEGATIVE for changes in mood or affect    OBJECTIVE:   /82   Pulse 97   Ht 1.689 m (5' 6.5\")   Wt 117.6 kg (259 lb 3.2 oz)   SpO2 95%   BMI 41.21 kg/m    EXAM:  GENERAL: healthy, alert and no distress  EYES: Eyes grossly normal to inspection, PERRL and conjunctivae and sclerae normal  HENT: ear canals and TM's normal, nose and mouth without ulcers or lesions  NECK: no adenopathy, no asymmetry, masses, or scars and thyroid normal to palpation  RESP: lungs clear to auscultation - no rales, rhonchi or wheezes  CV: regular rate and rhythm, normal S1 S2, no S3 or S4, no murmur, click or rub, no peripheral edema and peripheral pulses strong  ABDOMEN: soft, nontender, no hepatosplenomegaly, no masses and bowel sounds normal  MS: no gross musculoskeletal defects noted, no edema  SKIN: no suspicious lesions or rashes  NEURO: Normal strength and tone, mentation intact and speech normal  PSYCH: mentation appears normal, affect normal/bright        ASSESSMENT/PLAN:   Rima was seen today for physical.    Diagnoses and all orders for this visit:    Routine general medical examination at a health care facility  Healthy appearing 33 year old female. Immunizations are UTD, GYN issues dealt with by [facility]. Encouraged healthy lifestyle choices and exercise.    Morbid obesity (H)  -     Comp. Metabolic Panel (14) ([facility])  Currently doing Weight Watchers and lost 18 pounds. She is encouraged to continue with her " "efforts.    Menometrorrhagia  -     CBC with Diff/Plt ([facility])  -     Thyroxine (T4) Free  Direct  S ([facility])  -     TSH ([facility])  Need to verify no treatable cause.    Chronic sinusitis, unspecified location  Referral to allergist is given.    Screening for cardiovascular condition  -     Lipid Panel ([facility])    Encounter for vitamin deficiency screening  -     Vitamin D  25-Hydroxy ([facility])    Mood disorder (H)  -     Thyroxine (T4) Free  Direct  S ([facility])  -     TSH ([facility])        COUNSELING:   Reviewed preventive health counseling, as reflected in patient instructions       Regular exercise       Healthy diet/nutrition       Vision screening       Hearing screening       Contraception    BP Readings from Last 1 Encounters:   04/29/19 128/82     Estimated body mass index is 41.21 kg/m  as calculated from the following:    Height as of this encounter: 1.689 m (5' 6.5\").    Weight as of this encounter: 117.6 kg (259 lb 3.2 oz).    BP Screening:   Last 3 BP Readings:    BP Readings from Last 3 Encounters:   04/29/19 128/82   12/28/17 120/59   07/28/17 (!) 133/91       The following was recommended to the patient:  Re-screen BP within a year and recommended lifestyle modifications  Weight management plan: Specific weight management program called Weight Watchers discussed     reports that she quit smoking about 20 months ago. Her smoking use included cigarettes. She has a 3.00 pack-year smoking history. She quit smokeless tobacco use about 13 years ago.      Counseling Resources:  ATP IV Guidelines  Pooled Cohorts Equation Calculator  Breast Cancer Risk Calculator  FRAX Risk Assessment  ICSI Preventive Guidelines  Dietary Guidelines for Americans, 2010  USDA's MyPlate  ASA Prophylaxis  Lung CA Screening    Rima Roblero MD  [facility]  "

## 2019-04-29 NOTE — PATIENT INSTRUCTIONS

## 2019-04-29 NOTE — LETTER
[facility]  [address] [place]  [address]  Phone: [phone]    May 1, 2019      Rima Freeman  [address]    [facility] [address]              Dear Rima,    The results from your recent visit showed Labs are all entirely normal with the exception of a very low vitamin D level. Please add Vitamin D3 2,000 international unit(s) daily, and return in 8 weeks to recheck.         Sincerely,     Rima Roblero M.D.    Results for orders placed or performed in visit on 04/29/19   Lipid Panel ([facility])   Result Value Ref Range    Cholesterol 151 100 - 199 mg/dL    Triglycerides 97 0 - 149 mg/dL    HDL Cholesterol 41 >39 mg/dL    VLDL Cholesterol Juan Ramon 19 5 - 40 mg/dL    LDL Cholesterol Calculated 91 0 - 99 mg/dL    LDL/HDL Ratio 2.2 0.0 - 3.2 ratio    Narrative    Performed at:  01 - [facility] [address], [place], [place]  [id number]  : Gera Vikc MD, Phone:  [phone]   Comp. Metabolic Panel (14) ([facility])   Result Value Ref Range    Glucose 88 65 - 99 mg/dL    Urea Nitrogen 8 6 - 20 mg/dL    Creatinine 0.64 0.57 - 1.00 mg/dL    eGFR If NonAfricn Am 118 >59 mL/min/1.73    eGFR If Africn Am 137 >59 mL/min/1.73    BUN/Creatinine Ratio 13 9 - 23    Sodium 144 134 - 144 mmol/L    Potassium 4.8 3.5 - 5.2 mmol/L    Chloride 104 96 - 106 mmol/L    Total CO2 27 20 - 29 mmol/L    Calcium 9.4 8.7 - 10.2 mg/dL    Protein Total 6.9 6.0 - 8.5 g/dL    Albumin 4.2 3.5 - 5.5 g/dL    Globulin, Total 2.7 1.5 - 4.5 g/dL    A/G Ratio 1.6 1.2 - 2.2    Bilirubin Total 0.4 0.0 - 1.2 mg/dL    Alkaline Phosphatase 68 39 - 117 IU/L    AST 15 0 - 40 IU/L    ALT 11 0 - 32 IU/L    Narrative    Performed at:  01 - [facility] [place] [place]  [id number]  : Gera Vick MD, Phone:  [phone]   Vitamin D  25-Hydroxy ([facility])   Result Value Ref Range    Vitamin D,25-Hydroxy 17.9 (L) 30.0 - 100.0 ng/mL    Narrative    Performed at:  01 - [facility]  [address]  [place], [place]  [id number]  : Gera Vick MD, Phone:  [phone]   CBC with Diff/Plt ([facility])   Result Value Ref Range    WBC x10/cmm 6.3 3.8 - 11.0 x10/cmm    % Lymphocytes 29.4 20.5 - 51.1 %    % Monocytes 7.1 1.7 - 9.3 %    % Granulocytes 63.5 42.2 - 75.2 %    RBC x10/cmm 4.54 3.7 - 5.2 x10/cmm    Hemoglobin 13.2 11.8 - 15.5 g/dl    Hematocrit 39.7 35 - 46 %    MCV 87.4 80 - 100 fL    MCH 29.1 27.0 - 31.0 pg    MCHC 33.2 33.0 - 37.0 g/dL    Platelet Count 437 140 - 450 K/uL   Thyroxine (T4) Free  Direct  S ([facility])   Result Value Ref Range    T4 Free 1.17 0.82 - 1.77 ng/dL    Narrative    Performed at:  01 - [facility] [address], [place], [place]  [id number]  : Gera Vick MD, Phone:  [phone]   TSH ([facility])   Result Value Ref Range    TSH 1.380 0.450 - 4.500 uIU/mL    Narrative    Performed at:  01 - [facility] [address], [place], [place]  [id number]  : Gera Vick MD, Phone:  [phone]

## 2019-04-30 LAB
ALBUMIN SERPL-MCNC: 4.2 G/DL (ref 3.5–5.5)
ALBUMIN/GLOB SERPL: 1.6 {RATIO} (ref 1.2–2.2)
ALP SERPL-CCNC: 68 IU/L (ref 39–117)
ALT SERPL-CCNC: 11 IU/L (ref 0–32)
AST SERPL-CCNC: 15 IU/L (ref 0–40)
BILIRUB SERPL-MCNC: 0.4 MG/DL (ref 0–1.2)
BUN SERPL-MCNC: 8 MG/DL (ref 6–20)
BUN/CREATININE RATIO: 13 (ref 9–23)
CALCIUM SERPL-MCNC: 9.4 MG/DL (ref 8.7–10.2)
CHLORIDE SERPLBLD-SCNC: 104 MMOL/L (ref 96–106)
CHOLEST SERPL-MCNC: 151 MG/DL (ref 100–199)
CREAT SERPL-MCNC: 0.64 MG/DL (ref 0.57–1)
EGFR IF AFRICN AM: 137 ML/MIN/1.73
EGFR IF NONAFRICN AM: 118 ML/MIN/1.73
GLOBULIN, TOTAL: 2.7 G/DL (ref 1.5–4.5)
GLUCOSE SERPL-MCNC: 88 MG/DL (ref 65–99)
HDLC SERPL-MCNC: 41 MG/DL
LDL/HDL RATIO: 2.2 RATIO (ref 0–3.2)
LDLC SERPL CALC-MCNC: 91 MG/DL (ref 0–99)
POTASSIUM SERPL-SCNC: 4.8 MMOL/L (ref 3.5–5.2)
PROT SERPL-MCNC: 6.9 G/DL (ref 6–8.5)
SODIUM SERPL-SCNC: 144 MMOL/L (ref 134–144)
T4 FREE SERPL-MCNC: 1.17 NG/DL (ref 0.82–1.77)
TOTAL CO2: 27 MMOL/L (ref 20–29)
TRIGL SERPL-MCNC: 97 MG/DL (ref 0–149)
TSH BLD-ACNC: 1.38 UIU/ML (ref 0.45–4.5)
VITAMIN D, 25-HYDROXY: 17.9 NG/ML (ref 30–100)
VLDLC SERPL CALC-MCNC: 19 MG/DL (ref 5–40)

## 2024-11-12 ENCOUNTER — LAB REQUISITION (OUTPATIENT)
Dept: LAB | Facility: CLINIC | Age: 39
End: 2024-11-12
Payer: COMMERCIAL

## 2024-11-12 ENCOUNTER — TRANSFERRED RECORDS (OUTPATIENT)
Dept: HEALTH INFORMATION MANAGEMENT | Facility: CLINIC | Age: 39
End: 2024-11-12

## 2024-11-12 DIAGNOSIS — Z12.4 ENCOUNTER FOR SCREENING FOR MALIGNANT NEOPLASM OF CERVIX: ICD-10-CM

## 2024-11-13 ENCOUNTER — MEDICAL CORRESPONDENCE (OUTPATIENT)
Dept: HEALTH INFORMATION MANAGEMENT | Facility: CLINIC | Age: 39
End: 2024-11-13
Payer: COMMERCIAL

## 2024-11-13 ENCOUNTER — TRANSCRIBE ORDERS (OUTPATIENT)
Dept: MATERNAL FETAL MEDICINE | Facility: CLINIC | Age: 39
End: 2024-11-13
Payer: COMMERCIAL

## 2024-11-13 DIAGNOSIS — O26.90 PREGNANCY RELATED CONDITION, ANTEPARTUM: Primary | ICD-10-CM

## 2024-11-13 LAB
HPV HR 12 DNA CVX QL NAA+PROBE: NEGATIVE
HPV16 DNA CVX QL NAA+PROBE: NEGATIVE
HPV18 DNA CVX QL NAA+PROBE: NEGATIVE
HUMAN PAPILLOMA VIRUS FINAL DIAGNOSIS: NORMAL

## 2024-11-16 LAB
BKR AP ASSOCIATED HPV REPORT: NORMAL
BKR LAB AP GYN ADEQUACY: NORMAL
BKR LAB AP GYN INTERPRETATION: NORMAL
BKR LAB AP LMP: NORMAL
BKR LAB AP PREVIOUS ABNL DX: NORMAL
BKR LAB AP PREVIOUS ABNORMAL: NORMAL
PATH REPORT.COMMENTS IMP SPEC: NORMAL
PATH REPORT.COMMENTS IMP SPEC: NORMAL
PATH REPORT.RELEVANT HX SPEC: NORMAL

## 2025-01-10 ENCOUNTER — TRANSFERRED RECORDS (OUTPATIENT)
Dept: HEALTH INFORMATION MANAGEMENT | Facility: CLINIC | Age: 40
End: 2025-01-10
Payer: COMMERCIAL

## 2025-01-20 ENCOUNTER — PRE VISIT (OUTPATIENT)
Dept: MATERNAL FETAL MEDICINE | Facility: CLINIC | Age: 40
End: 2025-01-20

## 2025-01-24 ENCOUNTER — HOSPITAL ENCOUNTER (OUTPATIENT)
Dept: ULTRASOUND IMAGING | Facility: CLINIC | Age: 40
Discharge: HOME | End: 2025-01-24
Attending: STUDENT IN AN ORGANIZED HEALTH CARE EDUCATION/TRAINING PROGRAM

## 2025-01-24 DIAGNOSIS — O26.90 PREGNANCY RELATED CONDITION, ANTEPARTUM: ICD-10-CM

## 2025-01-24 PROCEDURE — 76811 OB US DETAILED SNGL FETUS: CPT

## 2025-03-31 ENCOUNTER — LAB REQUISITION (OUTPATIENT)
Dept: LAB | Facility: CLINIC | Age: 40
End: 2025-03-31

## 2025-03-31 DIAGNOSIS — O99.019 ANEMIA COMPLICATING PREGNANCY, UNSPECIFIED TRIMESTER: ICD-10-CM

## 2025-03-31 DIAGNOSIS — Z36.9 ENCOUNTER FOR ANTENATAL SCREENING, UNSPECIFIED: ICD-10-CM

## 2025-03-31 LAB
BASOPHILS # BLD AUTO: 0 10E3/UL (ref 0–0.2)
BASOPHILS NFR BLD AUTO: 0 %
BLD GP AB SCN SERPL QL: NEGATIVE
EOSINOPHIL # BLD AUTO: 0.1 10E3/UL (ref 0–0.7)
EOSINOPHIL NFR BLD AUTO: 1 %
ERYTHROCYTE [DISTWIDTH] IN BLOOD BY AUTOMATED COUNT: 13.2 % (ref 10–15)
FERRITIN SERPL-MCNC: 15 NG/ML (ref 6–175)
HCT VFR BLD AUTO: 32.9 % (ref 35–47)
HGB BLD-MCNC: 10.4 G/DL (ref 11.7–15.7)
IMM GRANULOCYTES # BLD: 0.1 10E3/UL
IMM GRANULOCYTES NFR BLD: 1 %
LYMPHOCYTES # BLD AUTO: 1.9 10E3/UL (ref 0.8–5.3)
LYMPHOCYTES NFR BLD AUTO: 21 %
MCH RBC QN AUTO: 28.6 PG (ref 26.5–33)
MCHC RBC AUTO-ENTMCNC: 31.6 G/DL (ref 31.5–36.5)
MCV RBC AUTO: 90 FL (ref 78–100)
MONOCYTES # BLD AUTO: 0.7 10E3/UL (ref 0–1.3)
MONOCYTES NFR BLD AUTO: 8 %
NEUTROPHILS # BLD AUTO: 6 10E3/UL (ref 1.6–8.3)
NEUTROPHILS NFR BLD AUTO: 69 %
NRBC # BLD AUTO: 0 10E3/UL
NRBC BLD AUTO-RTO: 0 /100
PLATELET # BLD AUTO: 413 10E3/UL (ref 150–450)
RBC # BLD AUTO: 3.64 10E6/UL (ref 3.8–5.2)
SPECIMEN EXP DATE BLD: NORMAL
WBC # BLD AUTO: 8.8 10E3/UL (ref 4–11)

## 2025-03-31 PROCEDURE — 86850 RBC ANTIBODY SCREEN: CPT | Performed by: ADVANCED PRACTICE MIDWIFE

## 2025-03-31 PROCEDURE — 86780 TREPONEMA PALLIDUM: CPT | Performed by: ADVANCED PRACTICE MIDWIFE

## 2025-03-31 PROCEDURE — 85018 HEMOGLOBIN: CPT | Performed by: ADVANCED PRACTICE MIDWIFE

## 2025-03-31 PROCEDURE — 82728 ASSAY OF FERRITIN: CPT | Performed by: ADVANCED PRACTICE MIDWIFE

## 2025-03-31 PROCEDURE — 85004 AUTOMATED DIFF WBC COUNT: CPT | Performed by: ADVANCED PRACTICE MIDWIFE

## 2025-04-01 LAB — T PALLIDUM AB SER QL: NONREACTIVE

## 2025-04-28 ENCOUNTER — LAB REQUISITION (OUTPATIENT)
Dept: LAB | Facility: CLINIC | Age: 40
End: 2025-04-28

## 2025-04-28 DIAGNOSIS — D64.9 ANEMIA, UNSPECIFIED: ICD-10-CM

## 2025-04-28 LAB
ERYTHROCYTE [DISTWIDTH] IN BLOOD BY AUTOMATED COUNT: 13.8 % (ref 10–15)
HCT VFR BLD AUTO: 34.7 % (ref 35–47)
HGB BLD-MCNC: 11.1 G/DL (ref 11.7–15.7)
MCH RBC QN AUTO: 29.3 PG (ref 26.5–33)
MCHC RBC AUTO-ENTMCNC: 32 G/DL (ref 31.5–36.5)
MCV RBC AUTO: 92 FL (ref 78–100)
PLATELET # BLD AUTO: 435 10E3/UL (ref 150–450)
RBC # BLD AUTO: 3.79 10E6/UL (ref 3.8–5.2)
WBC # BLD AUTO: 9.7 10E3/UL (ref 4–11)

## 2025-04-28 PROCEDURE — 85014 HEMATOCRIT: CPT

## 2025-05-12 ENCOUNTER — LAB REQUISITION (OUTPATIENT)
Dept: LAB | Facility: CLINIC | Age: 40
End: 2025-05-12

## 2025-05-12 DIAGNOSIS — L91.8 OTHER HYPERTROPHIC DISORDERS OF THE SKIN: ICD-10-CM

## 2025-05-12 PROCEDURE — 88305 TISSUE EXAM BY PATHOLOGIST: CPT | Performed by: PATHOLOGY

## 2025-05-15 LAB
PATH REPORT.COMMENTS IMP SPEC: NORMAL
PATH REPORT.FINAL DX SPEC: NORMAL
PATH REPORT.GROSS SPEC: NORMAL
PATH REPORT.MICROSCOPIC SPEC OTHER STN: NORMAL
PATH REPORT.RELEVANT HX SPEC: NORMAL
PHOTO IMAGE: NORMAL

## 2025-05-27 ENCOUNTER — LAB REQUISITION (OUTPATIENT)
Dept: LAB | Facility: CLINIC | Age: 40
End: 2025-05-27
Payer: COMMERCIAL

## 2025-05-27 DIAGNOSIS — Z34.90 ENCOUNTER FOR SUPERVISION OF NORMAL PREGNANCY, UNSPECIFIED, UNSPECIFIED TRIMESTER: ICD-10-CM

## 2025-05-27 PROCEDURE — 87653 STREP B DNA AMP PROBE: CPT | Mod: ORL | Performed by: NURSE PRACTITIONER

## 2025-05-28 LAB — GP B STREP DNA SPEC QL NAA+PROBE: NEGATIVE
